# Patient Record
Sex: FEMALE | Race: WHITE | NOT HISPANIC OR LATINO | URBAN - METROPOLITAN AREA
[De-identification: names, ages, dates, MRNs, and addresses within clinical notes are randomized per-mention and may not be internally consistent; named-entity substitution may affect disease eponyms.]

---

## 2017-11-09 ENCOUNTER — CONVERSION ENCOUNTER (OUTPATIENT)
Dept: GENERAL RADIOLOGY | Facility: HOSPITAL | Age: 82
End: 2017-11-09

## 2018-03-22 ENCOUNTER — CONVERSION ENCOUNTER (OUTPATIENT)
Dept: CARDIOLOGY | Facility: CLINIC | Age: 83
End: 2018-03-22
Attending: INTERNAL MEDICINE

## 2018-03-29 ENCOUNTER — OFFICE VISIT CONVERTED (OUTPATIENT)
Dept: ORTHOPEDIC SURGERY | Facility: CLINIC | Age: 83
End: 2018-03-29
Attending: ORTHOPAEDIC SURGERY

## 2018-05-08 ENCOUNTER — OFFICE VISIT CONVERTED (OUTPATIENT)
Dept: ORTHOPEDIC SURGERY | Facility: CLINIC | Age: 83
End: 2018-05-08
Attending: PHYSICIAN ASSISTANT

## 2018-06-05 ENCOUNTER — OFFICE VISIT CONVERTED (OUTPATIENT)
Dept: ORTHOPEDIC SURGERY | Facility: CLINIC | Age: 83
End: 2018-06-05
Attending: PHYSICIAN ASSISTANT

## 2018-06-18 ENCOUNTER — CONVERSION ENCOUNTER (OUTPATIENT)
Dept: GENERAL RADIOLOGY | Facility: HOSPITAL | Age: 83
End: 2018-06-18

## 2018-06-26 ENCOUNTER — CONVERSION ENCOUNTER (OUTPATIENT)
Dept: MAMMOGRAPHY | Facility: HOSPITAL | Age: 83
End: 2018-06-26

## 2018-07-10 ENCOUNTER — CONVERSION ENCOUNTER (OUTPATIENT)
Dept: ORTHOPEDIC SURGERY | Facility: CLINIC | Age: 83
End: 2018-07-10

## 2018-07-10 ENCOUNTER — OFFICE VISIT CONVERTED (OUTPATIENT)
Dept: ORTHOPEDIC SURGERY | Facility: CLINIC | Age: 83
End: 2018-07-10
Attending: PHYSICIAN ASSISTANT

## 2018-07-17 ENCOUNTER — OFFICE VISIT CONVERTED (OUTPATIENT)
Dept: ORTHOPEDIC SURGERY | Facility: CLINIC | Age: 83
End: 2018-07-17
Attending: PHYSICIAN ASSISTANT

## 2018-09-27 ENCOUNTER — OFFICE VISIT CONVERTED (OUTPATIENT)
Dept: CARDIOLOGY | Facility: CLINIC | Age: 83
End: 2018-09-27
Attending: INTERNAL MEDICINE

## 2018-10-09 ENCOUNTER — OFFICE VISIT CONVERTED (OUTPATIENT)
Dept: ORTHOPEDIC SURGERY | Facility: CLINIC | Age: 83
End: 2018-10-09
Attending: PHYSICIAN ASSISTANT

## 2019-01-17 ENCOUNTER — OFFICE VISIT CONVERTED (OUTPATIENT)
Dept: ORTHOPEDIC SURGERY | Facility: CLINIC | Age: 84
End: 2019-01-17
Attending: PHYSICIAN ASSISTANT

## 2019-01-25 ENCOUNTER — OFFICE VISIT CONVERTED (OUTPATIENT)
Dept: CARDIOLOGY | Facility: CLINIC | Age: 84
End: 2019-01-25
Attending: INTERNAL MEDICINE

## 2019-01-25 ENCOUNTER — HOSPITAL ENCOUNTER (OUTPATIENT)
Dept: OTHER | Facility: HOSPITAL | Age: 84
Discharge: HOME | End: 2019-01-25
Attending: INTERNAL MEDICINE

## 2019-01-25 ENCOUNTER — CONVERSION ENCOUNTER (OUTPATIENT)
Dept: CARDIOLOGY | Facility: CLINIC | Age: 84
End: 2019-01-25

## 2019-01-25 LAB
ALBUMIN SERPL-MCNC: 4.4 G/DL (ref 3.5–5)
ALBUMIN/GLOB SERPL: 1.6 {RATIO} (ref 1.4–2.6)
ALP SERPL-CCNC: 67 U/L (ref 43–160)
ALT SERPL-CCNC: 12 U/L (ref 10–40)
ANION GAP SERPL CALC-SCNC: 20 MMOL/L (ref 8–19)
AST SERPL-CCNC: 16 U/L (ref 15–50)
BASOPHILS # BLD AUTO: 0.04 10*3/UL (ref 0–0.2)
BASOPHILS NFR BLD AUTO: 0.9 % (ref 0–3)
BILIRUB SERPL-MCNC: 0.55 MG/DL (ref 0.2–1.3)
BUN SERPL-MCNC: 20 MG/DL (ref 5–25)
BUN/CREAT SERPL: 23 {RATIO} (ref 6–20)
CALCIUM SERPL-MCNC: 9.6 MG/DL (ref 8.7–10.4)
CHLORIDE SERPL-SCNC: 100 MMOL/L (ref 99–111)
CONV CO2: 21 MMOL/L (ref 22–32)
CONV TOTAL PROTEIN: 7.2 G/DL (ref 6.3–8.2)
CREAT UR-MCNC: 0.87 MG/DL (ref 0.5–0.9)
EOSINOPHIL # BLD AUTO: 0.07 10*3/UL (ref 0–0.7)
EOSINOPHIL # BLD AUTO: 1.5 % (ref 0–7)
ERYTHROCYTE [DISTWIDTH] IN BLOOD BY AUTOMATED COUNT: 13.2 % (ref 11.5–14.5)
GFR SERPLBLD BASED ON 1.73 SQ M-ARVRAT: >60 ML/MIN/{1.73_M2}
GLOBULIN UR ELPH-MCNC: 2.8 G/DL (ref 2–3.5)
GLUCOSE SERPL-MCNC: 133 MG/DL (ref 65–99)
HBA1C MFR BLD: 11.4 G/DL (ref 12–16)
HCT VFR BLD AUTO: 34.3 % (ref 37–47)
LYMPHOCYTES # BLD AUTO: 1.06 10*3/UL (ref 1–5)
MCH RBC QN AUTO: 34.7 PG (ref 27–31)
MCHC RBC AUTO-ENTMCNC: 33.2 G/DL (ref 33–37)
MCV RBC AUTO: 104.3 FL (ref 81–99)
MONOCYTES # BLD AUTO: 0.42 10*3/UL (ref 0.2–1.2)
MONOCYTES NFR BLD AUTO: 8.9 % (ref 3–10)
NEUTROPHILS # BLD AUTO: 3.1 10*3/UL (ref 2–8)
NEUTROPHILS NFR BLD AUTO: 65.9 % (ref 30–85)
OSMOLALITY SERPL CALC.SUM OF ELEC: 289 MOSM/KG (ref 273–304)
PLATELET # BLD AUTO: 295 10*3/UL (ref 130–400)
PMV BLD AUTO: 9.5 FL (ref 7.4–10.4)
POTASSIUM SERPL-SCNC: 4.3 MMOL/L (ref 3.5–5.3)
RBC # BLD AUTO: 3.29 10*6/UL (ref 4.2–5.4)
SODIUM SERPL-SCNC: 137 MMOL/L (ref 135–147)
VARIANT LYMPHS NFR BLD MANUAL: 22.6 % (ref 20–45)
WBC # BLD AUTO: 4.7 10*3/UL (ref 4.8–10.8)

## 2019-02-01 ENCOUNTER — OFFICE VISIT CONVERTED (OUTPATIENT)
Dept: CARDIOLOGY | Facility: CLINIC | Age: 84
End: 2019-02-01
Attending: INTERNAL MEDICINE

## 2019-02-14 ENCOUNTER — OFFICE VISIT CONVERTED (OUTPATIENT)
Dept: ORTHOPEDIC SURGERY | Facility: CLINIC | Age: 84
End: 2019-02-14
Attending: PHYSICIAN ASSISTANT

## 2019-03-28 ENCOUNTER — OFFICE VISIT CONVERTED (OUTPATIENT)
Dept: ORTHOPEDIC SURGERY | Facility: CLINIC | Age: 84
End: 2019-03-28
Attending: PHYSICIAN ASSISTANT

## 2019-03-28 ENCOUNTER — CONVERSION ENCOUNTER (OUTPATIENT)
Dept: ORTHOPEDIC SURGERY | Facility: CLINIC | Age: 84
End: 2019-03-28

## 2019-05-02 ENCOUNTER — HOSPITAL ENCOUNTER (OUTPATIENT)
Dept: GENERAL RADIOLOGY | Facility: HOSPITAL | Age: 84
Discharge: HOME | End: 2019-05-02

## 2019-06-06 ENCOUNTER — OFFICE VISIT CONVERTED (OUTPATIENT)
Dept: ORTHOPEDIC SURGERY | Facility: CLINIC | Age: 84
End: 2019-06-06
Attending: PHYSICIAN ASSISTANT

## 2019-07-02 ENCOUNTER — CONVERSION ENCOUNTER (OUTPATIENT)
Dept: SURGERY | Facility: CLINIC | Age: 84
End: 2019-07-02

## 2019-07-02 ENCOUNTER — OFFICE VISIT CONVERTED (OUTPATIENT)
Dept: SURGERY | Facility: CLINIC | Age: 84
End: 2019-07-02
Attending: SURGERY

## 2019-08-14 ENCOUNTER — OFFICE VISIT CONVERTED (OUTPATIENT)
Dept: CARDIOLOGY | Facility: CLINIC | Age: 84
End: 2019-08-14
Attending: INTERNAL MEDICINE

## 2019-08-14 ENCOUNTER — CONVERSION ENCOUNTER (OUTPATIENT)
Dept: CARDIOLOGY | Facility: CLINIC | Age: 84
End: 2019-08-14

## 2019-08-30 ENCOUNTER — OFFICE VISIT CONVERTED (OUTPATIENT)
Dept: NEUROSURGERY | Facility: CLINIC | Age: 84
End: 2019-08-30
Attending: PHYSICIAN ASSISTANT

## 2019-08-30 ENCOUNTER — CONVERSION ENCOUNTER (OUTPATIENT)
Dept: NEUROLOGY | Facility: CLINIC | Age: 84
End: 2019-08-30

## 2019-10-08 ENCOUNTER — OFFICE VISIT CONVERTED (OUTPATIENT)
Dept: SURGERY | Facility: CLINIC | Age: 84
End: 2019-10-08
Attending: SURGERY

## 2019-10-29 ENCOUNTER — OFFICE VISIT CONVERTED (OUTPATIENT)
Dept: NEUROSURGERY | Facility: CLINIC | Age: 84
End: 2019-10-29
Attending: NEUROLOGICAL SURGERY

## 2019-10-29 ENCOUNTER — CONVERSION ENCOUNTER (OUTPATIENT)
Dept: NEUROLOGY | Facility: CLINIC | Age: 84
End: 2019-10-29

## 2019-12-06 ENCOUNTER — OFFICE VISIT CONVERTED (OUTPATIENT)
Dept: ORTHOPEDIC SURGERY | Facility: CLINIC | Age: 84
End: 2019-12-06
Attending: PHYSICIAN ASSISTANT

## 2020-01-17 ENCOUNTER — OFFICE VISIT CONVERTED (OUTPATIENT)
Dept: ORTHOPEDIC SURGERY | Facility: CLINIC | Age: 85
End: 2020-01-17
Attending: PHYSICIAN ASSISTANT

## 2020-03-10 ENCOUNTER — HOSPITAL ENCOUNTER (OUTPATIENT)
Dept: SURGERY | Facility: HOSPITAL | Age: 85
Setting detail: HOSPITAL OUTPATIENT SURGERY
Discharge: HOME | End: 2020-03-10
Attending: OPHTHALMOLOGY

## 2020-03-12 ENCOUNTER — OFFICE VISIT CONVERTED (OUTPATIENT)
Dept: CARDIOLOGY | Facility: CLINIC | Age: 85
End: 2020-03-12
Attending: INTERNAL MEDICINE

## 2020-04-21 ENCOUNTER — OFFICE VISIT CONVERTED (OUTPATIENT)
Dept: ORTHOPEDIC SURGERY | Facility: CLINIC | Age: 85
End: 2020-04-21
Attending: PHYSICIAN ASSISTANT

## 2020-05-31 LAB — SARS-COV-2 RNA SPEC QL NAA+PROBE: NOT DETECTED

## 2020-06-02 ENCOUNTER — HOSPITAL ENCOUNTER (OUTPATIENT)
Dept: PERIOP | Facility: HOSPITAL | Age: 85
Setting detail: HOSPITAL OUTPATIENT SURGERY
Discharge: HOME | End: 2020-06-02
Attending: OPHTHALMOLOGY

## 2020-07-14 ENCOUNTER — HOSPITAL ENCOUNTER (OUTPATIENT)
Dept: GENERAL RADIOLOGY | Facility: HOSPITAL | Age: 85
Discharge: HOME | End: 2020-07-14

## 2020-07-16 ENCOUNTER — OFFICE VISIT CONVERTED (OUTPATIENT)
Dept: SURGERY | Facility: CLINIC | Age: 85
End: 2020-07-16
Attending: SURGERY

## 2020-07-23 ENCOUNTER — HOSPITAL ENCOUNTER (OUTPATIENT)
Dept: ULTRASOUND IMAGING | Facility: HOSPITAL | Age: 85
Discharge: HOME | End: 2020-07-23

## 2020-07-27 ENCOUNTER — HOSPITAL ENCOUNTER (OUTPATIENT)
Dept: MRI IMAGING | Facility: HOSPITAL | Age: 85
Discharge: HOME | End: 2020-07-27
Attending: SURGERY

## 2020-07-27 LAB
CREAT BLD-MCNC: 0.9 MG/DL (ref 0.6–1.4)
GFR SERPLBLD BASED ON 1.73 SQ M-ARVRAT: 58 ML/MIN/{1.73_M2}

## 2020-07-28 ENCOUNTER — HOSPITAL ENCOUNTER (OUTPATIENT)
Dept: PREADMISSION TESTING | Facility: HOSPITAL | Age: 85
Discharge: HOME | End: 2020-07-28
Attending: SURGERY

## 2020-07-30 ENCOUNTER — HOSPITAL ENCOUNTER (OUTPATIENT)
Dept: ULTRASOUND IMAGING | Facility: HOSPITAL | Age: 85
Discharge: HOME | End: 2020-07-30
Attending: SURGERY

## 2020-07-30 LAB — SARS-COV-2 RNA SPEC QL NAA+PROBE: NOT DETECTED

## 2020-08-06 ENCOUNTER — OFFICE VISIT CONVERTED (OUTPATIENT)
Dept: OTHER | Facility: HOSPITAL | Age: 85
End: 2020-08-06
Attending: SURGERY

## 2020-08-06 ENCOUNTER — HOSPITAL ENCOUNTER (OUTPATIENT)
Dept: OTHER | Facility: HOSPITAL | Age: 85
Discharge: HOME | End: 2020-08-06
Attending: INTERNAL MEDICINE

## 2020-08-06 ENCOUNTER — OFFICE VISIT CONVERTED (OUTPATIENT)
Dept: ONCOLOGY | Facility: HOSPITAL | Age: 85
End: 2020-08-06
Attending: INTERNAL MEDICINE

## 2020-08-13 ENCOUNTER — HOSPITAL ENCOUNTER (OUTPATIENT)
Dept: PREADMISSION TESTING | Facility: HOSPITAL | Age: 85
Discharge: HOME | End: 2020-08-13
Attending: SURGERY

## 2020-08-15 LAB — SARS-COV-2 RNA SPEC QL NAA+PROBE: NOT DETECTED

## 2021-05-10 NOTE — H&P
History and Physical      Patient Name: Mckayla Elias   Patient ID: [id number]   Sex: Female   YOB: 1934    Primary Care Provider: Jerri ROWLEY   Referring Provider: Jerri ROWLEY    Visit Date: August 6, 2020    Provider: Katherine Curry MD   Location: [facility]   Location Address: [facility]  [place]  [id number]   Location Phone: [phone]          Chief Complaint  · Breast Cancer  · Pre-Surgical History and Physical Examination for [facility]  · Consents for Surgery      History Of Present Illness  Mckayla Elias is a 86 year old /White female who is referred from Jerri ROWLEY ; very pleasant lady with 2 sites on left breast ( one at 3 oclock and one at 9 oclock) and one biopsied on right ( that one returned as ADH.)   Palpation-guided or Image Guided needle biopsy:    * Image guided needle biospy performed. Results from the needle guided biopsy are both sites + for IDC grade 1 ER/VT + her 2 negative and ki 67 of 5% so multicentric with surrounding LCIS.         Past Medical History  Acquired spondylolisthesis , lumbar; Arthritis; Bilateral hip pain; Bilateral knee pain; Degeneration of lumbar intervertebral disc; Diverticulitis; Gastroesophageal reflux; Hypertension; Hypothyroidism; Limb Swelling; Lumbago; Lumbago/low back pain; Osteoarthritis; Primary osteoarthritis of knees, bilateral; Reflux; Thyroid disorder         Past Surgical History  Artificial Joints/Limbs; Breast biopsy; Cardiac Catherization; D&C; Excision of bone spur of left calcaneus; Spinal Surgery         Medication List  acetaminophen 500 mg oral tablet; albuterol sulfate 90 mcg/actuation inhalation HFA aerosol inhaler; hydrochlorothiazide 12.5 mg oral capsule; iron 325 mg (65 mg iron) oral tablet; levothyroxine 25 mcg oral tablet; losartan 25 mg oral tablet; metoprolol succinate 25 mg oral tablet extended release 24 hr; Probiotic 15 billion cell oral capsule;  "ranitidine HCl 150 mg oral tablet; sucralfate 1 gram oral tablet; tramadol 50 mg oral tablet; Voltaren 1 % topical gel         Allergy List  Demerol; Lamisil; PENICILLINS; TETRACYCLINES       Allergies Reconciled  Family Medical History  Heart Disease; Cancer, Unspecified; Spine Problems; Family history of certain chronic disabling diseases; arthritis; Osteoporosis         Social History  Alcohol Use (Never); Claustophobic (Unknown); lives with children; lives with spouse; .; Recreational Drug Use (Never); Retired.; Tobacco (Never)         Review of Systems  · Constitutional  o Denies  o : fever, chills  · Breasts  o * See HPI  · Cardiovascular  o Denies  o : chest pain, cardiac murmurs, irregular heart beats, dyspnea on exertion  · Integument  o Denies  o : rash, itching, new skin lesions  · Heme-Lymph  o Denies  o : easy bleeding, easy bruising, lymph node enlargement or tenderness      Vitals  Date Time BP Position Site L\R Cuff Size HR RR TEMP (F) WT  HT  BMI kg/m2 BSA m2 O2 Sat        08/06/2020 01:22 PM       16  177lbs 2oz 5'  3.5\" 30.88 1.9           Physical Examination  · Constitutional  o Appearance  o : A well-nourished, well-developed patient who ambulates without difficulty, Alert and Oriented X3.  · Respiratory  o Respiratory Effort  o : breathing unlabored  o Inspection of Chest  o : breaths equal bilaterally  · Breasts  o Inspection of Breasts  o : developmental state normal for age  o Palpation of Breasts, Axillae  o : no changes          Assessment  · Malignant neoplasm of overlapping sites of left breast in female, estrogen receptor positive       Malignant neoplasm of overlapping sites of left female breast     174.8/C50.812  Estrogen receptor positive status [ER+]     174.8/Z17.0  · Preoperative Examination     V72.83      Plan  · Orders  o General Surgery Order (GENOR) - - 08/06/2020  o Lymphedema Clinic Consult (Order_PT/OT Consult for Bioimpedence Lymph fluid analysis,Pre-op and " retest Post-op, every 3 mo. Yr. 1-3, 6 mo. Yr. 4-5, annually year 6+) (LYMCL) - 174.8/C50.812, 174.8/Z17.0 - 08/06/2020  o [place] Node(Left) Identification (Detection) [[id number]] (29057) - 174.8/C50.812, 174.8/Z17.0 - 08/18/2020  o General Surgery Order (GENOR) - 174.8/C50.812, 174.8/Z17.0 - 08/18/2020  o [facility] Pre-Op Covid-19 Screening (04378) - 174.8/C50.812, 174.8/Z17.0 - 08/13/2020  · Medications  o Medications have been Reconciled  o Transition of Care or Provider Policy  · Instructions  o DISCUSSION:  o The patient has a primary breast cancer confirmed by biopsy. I have reviewed the radiographic studies and pathology report. I have recommended a surgical procedure to the patient as a option in treatment. Other options were discussed in detail with ample time to answer questions.  o PLAN:  o ****Surgical Treatment Addendum****  o Breast Conserving Therapy: Offered and patient declined. REASON: multicentric disease and pt does not want radiation  o [place] node dissection offfered and will perform as a part of surgery.  o Reconstructive/Plastice Surgery referral offered prior to surgery and patient declined. Reason: does not want  o Handouts Provided-Pre-Procedure Instructions including date and time and location of procedure.  o ****Surgical Orders****  o ****Patient Status****  o RISK AND BENEFITS:  o Consent for surgery: Given these options, the patient has verbally expressed an understanding of the risks of surgery and finds these risks acceptable. We will proceed with surgery as soon as possible.  o Possible risks, complications, benefits, and alternatives to surgical or invasive procedure have been explained to patient and/or legal guardian.  o O.R. PREP: Per protocol  o IV: Per Anesthesia  o Please sign permit for: Bilateral mastectomy with left sentinel node identification and biopsy, possible axillary dissection  o Kefzol 2 grams IV on call to OR.  o The above History and Physical Examination has been  completed within 30 days of admission.            Electronically Signed by: Katherine Curry MD -Author on August 6, 2020 02:11:49 PM

## 2021-05-10 NOTE — H&P
History and Physical      Patient Name: Mckayla Elias   Patient ID: [id number]   Sex: Female   YOB: 1934    Primary Care Provider: Jerri ROWLEY   Referring Provider: Jerri ROWLEY    Visit Date: July 16, 2020    Provider: Katherine Curry MD   Location: [facility]   Location Address: [address] [place] [address]  [id number]   Location Phone: [phone]          Chief Complaint  · Right breast mass  · Pre-Surgical History and Physical Examination for [facility]  · Consents for Surgery      History Of Present Illness  Mckayla Elias is a 86 year old /White female who is referred from Jerri ROWLEY ; very pleasant lady here with a palpable right breast mass that was not seen on imaging and 2 sub cm masses on left breast that are scheduled to be biopsied next Thursday. She has an extensive breast cancer history and her daughter passed away from breast cancer and all of her nieces have had it and all but one passed from breast cancer.             Past Medical History  Acquired spondylolisthesis , lumbar; Arthritis; Bilateral hip pain; Bilateral knee pain; Degeneration of lumbar intervertebral disc; Diverticulitis; Gastroesophageal reflux; Hypertension; Hypothyroidism; Limb Swelling; Lumbago; Lumbago/low back pain; Osteoarthritis; Primary osteoarthritis of knees, bilateral; Reflux; Thyroid disorder         Past Surgical History  Artificial Joints/Limbs; Breast biopsy; Cardiac Catherization; D&C; Excision of bone spur of left calcaneus; Spinal Surgery         Medication List  acetaminophen 500 mg oral tablet; albuterol sulfate 90 mcg/actuation inhalation HFA aerosol inhaler; hydrochlorothiazide 12.5 mg oral capsule; iron 325 mg (65 mg iron) oral tablet; levothyroxine 25 mcg oral tablet; losartan 25 mg oral tablet; metoprolol succinate 25 mg oral tablet extended release 24 hr; Probiotic 15 billion cell oral capsule; ranitidine HCl 150 mg oral tablet; sucralfate 1  "gram oral tablet; tramadol 50 mg oral tablet; Voltaren 1 % topical gel         Allergy List  Demerol; Lamisil; PENICILLINS; TETRACYCLINES       Allergies Reconciled  Family Medical History  Heart Disease; Cancer, Unspecified; Spine Problems; Family history of certain chronic disabling diseases; arthritis; Osteoporosis         Social History  Alcohol Use (Never); Claustophobic (Unknown); lives with children; lives with spouse; .; Recreational Drug Use (Never); Retired.; Tobacco (Never)         Review of Systems  · Constitutional  o Denies  o : fever, chills  · Breasts  o * See HPI  · Cardiovascular  o Denies  o : chest pain, cardiac murmurs, irregular heart beats, dyspnea on exertion  · Integument  o Denies  o : rash, itching, new skin lesions  · Heme-Lymph  o Denies  o : easy bleeding, easy bruising, lymph node enlargement or tenderness      Vitals  Date Time BP Position Site L\R Cuff Size HR RR TEMP (F) WT  HT  BMI kg/m2 BSA m2 O2 Sat HC       07/16/2020 09:18 AM       16  184lbs 0oz 5'  4\" 31.58 1.94           Physical Examination  · Constitutional  o Appearance  o : A well-nourished, well-developed patient who ambulates without difficulty, Alert and Oriented X3.  · Respiratory  o Respiratory Effort  o : breathing unlabored  o Inspection of Chest  o : breaths equal bilaterally  · Breasts  o Inspection of Breasts  o : developmental state normal for age  o Palpation of Breasts, Axillae  o : Palpable 2 cm mass just under right areola at 12 oclock, no palpable masses on left, no LAD          Assessment  · Breast Mass     611.72/N63  · Preoperative Examination     V72.83      Plan  · Orders  o General Surgery Order (GENOR) - - 07/16/2020  o Lymphedema Clinic Consult (Order_PT/OT Consult for Bioimpedence Lymph fluid analysis,Pre-op and retest Post-op, every 3 mo. Yr. 1-3, 6 mo. Yr. 4-5, annually year 6+) (LYMCL) - - 07/16/2020  o [facility] Pre-Op Covid-19 Screening (03962) - 611.72/N63 - 07/28/2020  o General Surgery " Order (MARYBEL) - 611.72/N63 - 07/31/2020  · Medications  o Medications have been Reconciled  o Transition of Care or Provider Policy  · Instructions  o PLAN:  o Handouts Provided-Pre-Procedure Instructions including date and time and location of procedure.  o ****Surgical Orders****  o ****Patient Status****  o Followup on biopsies to ensure we do not need to revise surgical plan to include left breast   o RISK AND BENEFITS:  o Consent for surgery: Given these options, the patient has verbally expressed an understanding of the risks of surgery and finds these risks acceptable. We will proceed with surgery as soon as possible.  o Possible risks, complications, benefits, and alternatives to surgical or invasive procedure have been explained to patient and/or legal guardian.  o O.R. PREP: Per protocol  o IV: Per Anesthesia  o Please sign permit for: Excision of right breast mass  o Kefzol 2 grams IV on call to OR.  o The above History and Physical Examination has been completed within 30 days of admission.            Electronically Signed by: Katherine Curry MD -Author on July 16, 2020 09:35:18 AM

## 2021-05-12 NOTE — PROGRESS NOTES
Progress Note      Patient Name: Mckayla Elias   Patient ID: [id number]   Sex: Female   YOB: 1934    Primary Care Provider: Jerri ROWLEY   Referring Provider: Jerri ROWLEY    Visit Date: April 21, 2020    Provider: Flori Baldwin PA-C   Location: [facility]   Location Address: [address] [place] [address]  [id number]   Location Phone: [phone]          Chief Complaint  · Bilateral Knee pain      History Of Present Illness  Mckayla Elias is a 86 year old /White female who presents today to [facility].      Patient is status post right TKA 4/23/18 and left TKA 12/31/18. She had recent fall in August resulting in left leg pain. Patient states pain at the low back radiating to left knee. Patient states IM steroid injection provided relief of pain.          Past Medical History  Acquired spondylolisthesis , lumbar; Arthritis; Bilateral hip pain; Bilateral knee pain; Degeneration of lumbar intervertebral disc; Diverticulitis; Gastroesophageal reflux; Hypertension; Hypothyroidism; Limb Swelling; Lumbago; Lumbago/low back pain; Osteoarthritis; Primary osteoarthritis of knees, bilateral; Reflux; Thyroid disorder         Past Surgical History  Artificial Joints/Limbs; Breast biopsy; Cardiac Catherization; D&C; Excision of bone spur of left calcaneus; Spinal Surgery         Medication List  acetaminophen 500 mg oral tablet; albuterol sulfate 90 mcg/actuation inhalation HFA aerosol inhaler; hydrochlorothiazide 12.5 mg oral capsule; iron 325 mg (65 mg iron) oral tablet; levothyroxine 25 mcg oral tablet; losartan 25 mg oral tablet; metoprolol succinate 25 mg oral tablet extended release 24 hr; Probiotic 15 billion cell oral capsule; ranitidine HCl 150 mg oral tablet; sucralfate 1 gram oral tablet; tramadol 50 mg oral tablet; Voltaren 1 % topical gel         Allergy List  Demerol; Lamisil; PENICILLINS; TETRACYCLINES         Family Medical History  Heart Disease; Cancer,  "Unspecified; Spine Problems; Family history of certain chronic disabling diseases; arthritis; Osteoporosis         Social History  Alcohol Use (Never); Claustophobic (Unknown); lives with children; lives with spouse; .; Recreational Drug Use (Never); Retired.; Tobacco (Never)         Review of Systems  · Constitutional  o Denies  o : fever, chills, weight loss  · Cardiovascular  o Denies  o : chest pain, shortness of breath  · Gastrointestinal  o Denies  o : liver disease, heartburn, nausea, blood in stools  · Genitourinary  o Denies  o : painful urination, blood in urine  · Integument  o Denies  o : rash, itching  · Neurologic  o Denies  o : headache, weakness, loss of consciousness  · Musculoskeletal  o Denies  o : painful, swollen joints  · Psychiatric  o Denies  o : drug/alcohol addiction, anxiety, depression      Vitals  Date Time BP Position Site L\R Cuff Size HR RR TEMP (F) WT  HT  BMI kg/m2 BSA m2 O2 Sat        04/21/2020 01:59 PM      91 - R   184lbs 6oz 5'  4\" 31.65 1.94 92 %          Physical Examination  · Constitutional  o Appearance  o : well developed, well-nourished, no obvious deformities present  · Head and Face  o Head  o :   § Inspection  § : normocephalic  o Face  o :   § Inspection  § : no facial lesions  · Eyes  o Conjunctivae  o : conjunctivae normal  o Sclerae  o : sclerae white  · Ears, Nose, Mouth and Throat  o Ears  o :   § External Ears  § : appearance within normal limits  § Hearing  § : intact  o Nose  o :   § External Nose  § : appearance normal  · Neck  o Inspection/Palpation  o : normal appearance  o Range of Motion  o : full range of motion  · Respiratory  o Respiratory Effort  o : breathing unlabored  o Inspection of Chest  o : normal appearance  o Auscultation of Lungs  o : no audible wheezing or rales  · Cardiovascular  o Heart  o : regular rate  · Gastrointestinal  o Abdominal Examination  o : soft and non-tender  · Skin and Subcutaneous Tissue  o General " Inspection  o : intact, no rashes  · Psychiatric  o General  o : Alert and oriented x3  o Judgement and Insight  o : judgment and insight intact  o Mood and Affect  o : mood normal, affect appropriate  · Back  o Inspection  o : no skin discoloration, no swelling. Mild tenderness lumbar spine. Neurovascular and sensation intact.  · Injection Note/Aspiration Note  o Site  o : right hip  o Procedure  o : Procedure: After educating the patient, patient gave consent for the procedure. After using alcohol prep, injection was given. The patient tolerated the procedure well.   o Medication  o : 2ml's of 4 mg Dexamethasone  · Right Knee-Street  o Inspection  o : limping gait, weight bearing, no swelling, no ecchymosis, no atrophy, neutral alignment  o Palpation  o : no medial joint line tenderness, no lateral joint line tenderness, no patellar tendon tenderness, no pain of MCL, no pain at LCL  o ROM  o : full extension, full flexion  o Strength  o : full extension, full flexion  o Special Tests  o : negative varus stress, negaitve valgus stress  o Neurovascular  o : Full sensation, Dorsal Pedal Pulse 2+, posteriror tibialis pulse 2+  · Left Knee-Street  o Inspection  o : limping gait, weight bearing, no swelling, no ecchymosis, no atrophy, neutral alignment  o Palpation  o : no medial joint line tenderness, no lateral joint line tenderness, no patellar tendon tenderness, no pain of MCL, no pain at LCL  o ROM  o : full extension, full flexion  o Strength  o : full extension, full flexion  o Special Tests  o : negative varus stress, negaitve valgus stress  o Neurovascular  o : Full sensation, Dorsal Pedal Pulse 2+, posteriror tibialis pulse 2+          Assessment  · Low Back Pain     724.2/M54.5  · Pain in both knees, unspecified chronicity       Pain in right knee     719.46/M25.561  Pain in left knee     719.46/M25.562      Plan  · Orders  o 2.00 - Dexamethasone Injection 8mg (-9) - 719.46/M25.562 - 04/21/2020   Lot  [id number] Exp 01 2021 [facility] Administered by MARVA PORTER  o IM/SQ - Injection Fee [facility] 56508 - 719.46/M25.562 - 04/21/2020   Administered by MARVA PORTER  · Medications  o Medications have been Reconciled  o Transition of Care or Provider Policy  · Instructions  o Reviewed the patient's Past Medical, Social, and Family history as well as the ROS at today's visit, no changes.  o Call or return if worsening symptoms.  o Follow Up PRN.  o Electronically Identified Patient Education Materials Provided Electronically            Electronically Signed by: Flori Baldwin PA-C -Author on April 21, 2020 02:13:59 PM

## 2021-05-15 VITALS — BODY MASS INDEX: 32.1 KG/M2 | HEIGHT: 64 IN | OXYGEN SATURATION: 98 % | HEART RATE: 62 BPM | WEIGHT: 188 LBS

## 2021-05-15 VITALS — BODY MASS INDEX: 30.91 KG/M2 | RESPIRATION RATE: 14 BRPM | WEIGHT: 185.5 LBS | HEIGHT: 65 IN

## 2021-05-15 VITALS
DIASTOLIC BLOOD PRESSURE: 66 MMHG | HEIGHT: 65 IN | SYSTOLIC BLOOD PRESSURE: 146 MMHG | HEART RATE: 64 BPM | WEIGHT: 183 LBS | BODY MASS INDEX: 30.49 KG/M2

## 2021-05-15 VITALS
SYSTOLIC BLOOD PRESSURE: 133 MMHG | WEIGHT: 188.31 LBS | DIASTOLIC BLOOD PRESSURE: 60 MMHG | HEIGHT: 65 IN | BODY MASS INDEX: 31.38 KG/M2

## 2021-05-15 VITALS — BODY MASS INDEX: 31.32 KG/M2 | HEIGHT: 65 IN | HEART RATE: 65 BPM | WEIGHT: 188 LBS | OXYGEN SATURATION: 98 %

## 2021-05-15 VITALS — HEART RATE: 66 BPM | WEIGHT: 188 LBS | OXYGEN SATURATION: 97 % | HEIGHT: 64 IN | BODY MASS INDEX: 32.1 KG/M2

## 2021-05-15 VITALS — BODY MASS INDEX: 34.04 KG/M2 | HEIGHT: 63 IN | WEIGHT: 192.12 LBS | RESPIRATION RATE: 16 BRPM

## 2021-05-15 VITALS — BODY MASS INDEX: 33.84 KG/M2 | OXYGEN SATURATION: 99 % | HEART RATE: 72 BPM | HEIGHT: 64 IN | WEIGHT: 198.25 LBS

## 2021-05-15 VITALS — HEIGHT: 64 IN | BODY MASS INDEX: 31.41 KG/M2 | RESPIRATION RATE: 16 BRPM | WEIGHT: 184 LBS

## 2021-05-15 VITALS
SYSTOLIC BLOOD PRESSURE: 158 MMHG | DIASTOLIC BLOOD PRESSURE: 78 MMHG | BODY MASS INDEX: 31.99 KG/M2 | WEIGHT: 192 LBS | HEIGHT: 65 IN | HEART RATE: 66 BPM

## 2021-05-15 VITALS — OXYGEN SATURATION: 92 % | WEIGHT: 184.37 LBS | BODY MASS INDEX: 31.48 KG/M2 | HEIGHT: 64 IN | HEART RATE: 91 BPM

## 2021-05-15 VITALS — RESPIRATION RATE: 16 BRPM | BODY MASS INDEX: 31.38 KG/M2 | WEIGHT: 177.12 LBS | HEIGHT: 63 IN

## 2021-05-15 VITALS
DIASTOLIC BLOOD PRESSURE: 69 MMHG | BODY MASS INDEX: 31.86 KG/M2 | WEIGHT: 191.25 LBS | HEIGHT: 65 IN | SYSTOLIC BLOOD PRESSURE: 147 MMHG

## 2021-05-16 VITALS
BODY MASS INDEX: 30.66 KG/M2 | WEIGHT: 184 LBS | SYSTOLIC BLOOD PRESSURE: 146 MMHG | HEIGHT: 65 IN | DIASTOLIC BLOOD PRESSURE: 70 MMHG | HEART RATE: 73 BPM

## 2021-05-16 VITALS — OXYGEN SATURATION: 99 % | HEIGHT: 64 IN | BODY MASS INDEX: 30.73 KG/M2 | HEART RATE: 84 BPM | WEIGHT: 180 LBS

## 2021-05-16 VITALS
HEART RATE: 60 BPM | BODY MASS INDEX: 30.32 KG/M2 | HEIGHT: 65 IN | SYSTOLIC BLOOD PRESSURE: 130 MMHG | WEIGHT: 182 LBS | DIASTOLIC BLOOD PRESSURE: 56 MMHG

## 2021-05-16 VITALS — WEIGHT: 192 LBS | HEIGHT: 64 IN | BODY MASS INDEX: 32.78 KG/M2 | OXYGEN SATURATION: 98 % | HEART RATE: 68 BPM

## 2021-05-16 VITALS
BODY MASS INDEX: 31.16 KG/M2 | SYSTOLIC BLOOD PRESSURE: 140 MMHG | WEIGHT: 187 LBS | DIASTOLIC BLOOD PRESSURE: 58 MMHG | HEART RATE: 64 BPM | HEIGHT: 65 IN

## 2021-05-16 VITALS — BODY MASS INDEX: 30.57 KG/M2 | OXYGEN SATURATION: 98 % | HEIGHT: 65 IN | HEART RATE: 64 BPM | WEIGHT: 183.5 LBS

## 2021-05-16 VITALS — HEIGHT: 64 IN | WEIGHT: 184 LBS | HEART RATE: 70 BPM | OXYGEN SATURATION: 97 % | BODY MASS INDEX: 31.41 KG/M2

## 2021-05-16 VITALS — HEART RATE: 68 BPM | HEIGHT: 65 IN | OXYGEN SATURATION: 97 % | BODY MASS INDEX: 31.16 KG/M2 | WEIGHT: 187 LBS

## 2021-05-16 VITALS — WEIGHT: 182 LBS | HEART RATE: 82 BPM | BODY MASS INDEX: 31.07 KG/M2 | HEIGHT: 64 IN | OXYGEN SATURATION: 97 %

## 2021-05-16 VITALS — OXYGEN SATURATION: 98 % | HEART RATE: 70 BPM | WEIGHT: 192.06 LBS | BODY MASS INDEX: 32.79 KG/M2 | HEIGHT: 64 IN

## 2021-05-16 VITALS — WEIGHT: 183 LBS | OXYGEN SATURATION: 97 % | HEART RATE: 76 BPM | HEIGHT: 64 IN | BODY MASS INDEX: 31.24 KG/M2

## 2021-05-16 VITALS
HEART RATE: 136 BPM | HEIGHT: 65 IN | SYSTOLIC BLOOD PRESSURE: 160 MMHG | DIASTOLIC BLOOD PRESSURE: 94 MMHG | BODY MASS INDEX: 29.49 KG/M2 | WEIGHT: 177 LBS

## 2021-05-28 VITALS
TEMPERATURE: 98 F | WEIGHT: 177 LBS | OXYGEN SATURATION: 99 % | HEART RATE: 70 BPM | RESPIRATION RATE: 18 BRPM | SYSTOLIC BLOOD PRESSURE: 153 MMHG | DIASTOLIC BLOOD PRESSURE: 77 MMHG | HEIGHT: 63 IN | BODY MASS INDEX: 31.36 KG/M2

## 2021-05-28 NOTE — PROGRESS NOTES
Patient: ZO CARLSON     Acct: [id number]     Report: #[id number]  UNIT #: [id number]     : 1934    Encounter Date:2020  PRIMARY CARE: ADRIEN DILLON  ***Signed***  --------------------------------------------------------------------------------------------------------------------  NURSE INTAKE      Visit Type      New Patient Visit            Chief Complaint      BREAST CA      Intent of Therapy:  Curative            Referring Provider/Copies To      Referring Provider:  MAYNOR CURRY MD      Primary Care Provider:  ADRIEN DILLON      Copies To:   ADRIEN DLILON            History and Present Illness      Past Oncology Illness History      Patient presents today with biopsy-proven invasive ductal carcinoma of the Left     breast 2020 ER+ MT+ Her2-. Surgery scheduled for 2020 by Dr. Curry.            HPI - Oncology Interim      Patient presents today for evaluation of newly diagnosed left breast cancer.      Patient states that she has felt a slight lump in the breast for more than a     year.  This was initially evaluated on mammogram which was reportedly     unremarkable.  She notes that she saw surgeon who evaluated her and suggested     surveillance.  She notes that the lump has been unchanged over that timeframe.      Slightly tender to palpation.  She denies discharge or bleeding from either     nipple.  She denies any other masses or lymphadenopathy.  She underwent     mammogram 2020 which demonstrated concerning findings in the left breast as    well as a abnormality on the right.  Subsequent diagnostic mammogram and     ultrasound confirmed the findings.  Biopsy was recommended.  On 2020 she     underwent biopsy of 2 separate locations at the 3 and 9:00 positions of the left    breast.  These both demonstrated invasive ductal carcinoma with associated LCIS.     ER positive, MT positive, HER-2 negative.  Subsequent biopsy on the right     breast 2020 showed atypical  ductal hyperplasia.      She states she is in her usual health.  She denies other masses or     lymphadenopathy.  No unusual aches or pains.  She is able to perform all of her     ADLs.            Cancer Details            2020 Left invasive ductal carcinoma, grade 1. ER+, NM+, HER2-.  Right      breast biopsy demonstrates atypical ductal hyperplasia            Clinical Trial Participant      No            ECOG Performance Status      0            Most Recent Imaging Findings      Patient: ZO CARLSON   Acct: #[id number]   Report: #[id number]            UNIT #: [id number]    DOS: 20 1237      INSURANCE:MEDICARE PART A   LOCATION:     : 1934            PROVIDERS      ADMITTING:     ATTENDING: MAYNOR WHITLOCK MD      FAMILY:  ADRIEN DILLON   ORDERING:  MAYNOR WHITLOCK MD         OTHER:    DICTATING:  Fatemeh Bazzi MD            REQ #:[id number]   EXAM:BRELTDL - US BREAST LIMITED LEFT      REASON FOR EXAM:  LT BREAST ENHANCEMENT      REASON FOR VISIT:  LT BREAST ENHANCEMENT            *******Signed******         PROCEDURE:   LEFT BREAST ULTRASOUND, FOCUSED             COMPARISON:   [facility], MR, BREAST BILATERAL W/WO CONTRAST,     2020, 9:01.        [facility], US, US BREAST BILATERAL LIMITED, 2020,     14:48.  [facility], MG, DIG DIAG BILAT TORRES W 3D CAMRYN, 2020, 13:30.  [facility],       US, ULTRASOUND BREAST BX 1ST LESION, 2020, 8:48.  [facility],    US, US BREAST BX       EA ADDL, 2020, 8:31.             INDICATIONS:   86-year-old woman recommended for second-look ultrasound based on    bilateral abnormal       MRI findings.  She is considering breast conservation.             TECHNIQUE:   A targeted breast ultrasound was performed, with evaluation     focusing only on specific       areas of concern.             FINDINGS:         Right:      Sonographically  benign appearing right axillary lymph nodes are noted.             Corresponding to non mass enhancement in the subareolar slightly upper right     breast, there are       mildly dilated ducts containing debris or small masses at 12:00 o'clock, 1 cm     from the nipple       (included on the second series of the ultrasound exam).  This is amenable to     ultrasound-guided       biopsy.             Incidental note is made of a 7 x 4 x 6 mm irregular hypoechoic mass at 12:00, 4     cm from the nipple       in the right breast, for which ultrasound-guided biopsy is recommended.             Left:      Corresponding to non mass enhancement in the upper central left breast on MRI,     there is 7 x 4 x 6       mm heterogeneous mass with internal septations at 12:00, 3 cm from nipple, for     which       ultrasound-guided biopsy is recommended.             Probably corresponding to the focal non mass enhancement in the subareolar     slightly inner, slightly       lower left breast, there is a 4 x 4 x 4 mm hypoechoic mass in the retroareolar     left breast, which       is amenable to ultrasound-guided biopsy.             CONCLUSION:    Recommend ultrasound-guided biopsy of 2 right breast masses and 2    left breast       masses.  These account for MRI findings, and therefore MRI guided biopsy is not     needed.  I       discussed the result with the patient following the exam, and biopsy will be     performed today.             RECOMMENDATION(S):          ULTRASOUND-GUIDED CORE BIOPSY: BILATERAL BREASTS               BIRADS:          DIAGNOSTIC CATEGORY 4--SUSPICIOUS               PLEASE NOTE:  THE [employer] NOW RECOMMENDS THAT ALL WOMEN WITH     >20% LIFETIME RISK OF       BREAST CANCER UNDERGO ANNUAL SCREENING BREAST MRI AND WOMEN WITH 15-20% SPEAK     WITH THEIR DOCTORS       ABOUT ANNUAL SCREENING BREAST MRI.  A NORMAL ULTRASOUND EXAMINATION DOES NOT     EXCLUDE THE       POSSIBILITY OF BREAST CANCER.  A  CLINICALLY SUSPICIOUS PALPABLE LUMP SHOULD BE     BIOPSIED.              BONNIE HOLDEN MD             Electronically Signed and Approved By: BONNIE HOLDEN MD on 2020 at 16:34                                  Until signed, this is an unconfirmed preliminary report that may contain      errors and is subject to change.                                              BARLA:      D:20 1540      Patient: ZO CARLSON   Acct: #[id number]   Report: #[id number]            UNIT #: [id number]    DOS: 20 0831      INSURANCE:MEDICARE PART A   LOCATION:     : 1934            PROVIDERS      ADMITTING:     ATTENDING: ADRIEN DILLON      FAMILY:  ADRIEN DILLON   ORDERING:  ADRIEN DILLON         OTHER:    DICTATING:  Luis Harris DO            REQ #:[id number]   EXAM:BXDXNOCADL - DIG DX UNI POST BX NO CAD LEFT      REASON FOR EXAM:  LT BREAST MASSES      REASON FOR VISIT:  LT BREAST MASSES            *******Signed with Addenda******                  ADDENDUM            This report includes an Addendum and supersedes previous reports for this exam.             PROCEDURE:   ULTRASOUND BREAST BX 1ST LESION, 2020, 8:48      US BREAST BX EA ADDL, 2020, 8:31      DIG DX UNI POST BX NO CAD LT, 2020, 10:06             COMPARISON:   [facility], US, US BREAST BILATERAL LIMITED,    2020, 14:48.        [facility], MG, DIG DIAG BILAT TORRES W 3D CAMRYN, 2020,     13:30.        [facility], MG, DIG SCREENING BILAT TORRES W 3D CAMRYN,     2019, 12:47.        [facility], MG, DIGITAL SCREENING NO CAD, 2017,     12:41.  [facility], MG, DIGITAL SCREENING W/CAD, 2016, 11:40.  [facility], US,       ULTRASOUND BREAST BX 1ST LESION, 2018, 10:58.             INDICATIONS:   86-year-old female presents today for ultrasound-guided core     biopsies of an  "irregular       hypoechoic 17 mm mass in the medial left breast 9 o'clock position and a 7 mm     heterogeneous solid       mass in the lateral left breast at the 3 o'clock position.        DESCRIPTION:         Following a discussion of risks, benefits, and alternatives to the procedure     informed consent was       obtained. The patient's medication list was reviewed and documented in the     medical record. The       patient was placed in the supine position on the ultrasound procedure table.     Initial screening       ultrasound images of the left breast were obtained. There was re-identification     of the irregular       shadowing 17 mm mass in the medial left breast at the 9 o'clock position 9 cm     from the nipple as       well as the 7 mm heterogeneous mass in the lateral left breast at the 3 o'clock     position 10 cm from       the nipple seen on previous diagnostic imaging.  Both sites overlying the     finding was then marked       under ultrasound guidance.              Time out was performed. The site was then prepped and draped in the usual     sterile fashion.              First, the 7 mm mass in the lateral left breast was targeted using ultrasound     guidance.  2%       lidocaine was used for cutaneous local anesthesia. Lidocaine mixed with     epinephrine was used for       deep soft tissue local anesthesia and to assist with hemostasis. The 12 gauge     [employer] automated core       biopsy needle was advanced under ultrasound guidance to the lesion. A total of 3    core biopsy       specimens were obtained and placed in formalin, labeled \"Site A\" to be sent to     pathology for       further analysis. A coil shaped clip was then placed at the of biopsy under     ultrasound guidance.       Digital pressure was then held at the site of biopsy to assist with hemostasis.              Next, under ultrasound guidance, the 17 mm mass in the medial left breast was     targeted.  2%       lidocaine was " "used for cutaneous local anesthesia. Lidocaine mixed with     epinephrine was used for       deep soft tissue local anesthesia and to assist with hemostasis. The 12 gauge     [employer] automated core       biopsy needle was advanced under ultrasound guidance to the lesion. A total of 3    core biopsy       specimens were obtained and placed in formalin, labeled \"Site B\" to be sent to     pathology for       further analysis. A wing shaped clip was then placed at the of biopsy under     ultrasound guidance.       Digital pressure was then held at the site of biopsy to assist with hemostasis.             Postprocedure mammograms demonstrate both biopsy clips to be in good position.      No immediate       complications occurred.             CONCLUSION:   Successful ultrasound guided core biopsy of 2 separate lesions in     the left breast. The       patient tolerated the procedure well without immediate complication. Specimens     have been sent to       pathology for further analysis.  Addendum will be dictated upon receiving final     pathology for       concordance and recommendations.              ROSSY DAVILA DO             Electronically Signed and Approved By: ROSSY DAVILA DO on 2020 at 10:38                   ADDENDUM:              Pathology results are available.             A:  03:00:  Invasive ductal carcinoma.             B:  09:00:  Invasive ductal carcinoma, low grade DCIS.      Patient: ZO CARLSON   Acct: #[id number]   Report: #[id number]            UNIT #: [id number]    DOS: 20 0743      INSURANCE:MEDICARE PART A   LOCATION:MRI     : 1934            PROVIDERS      ADMITTING:     ATTENDING: MAYNOR WHITLOCK MD      FAMILY:  SANTANAADRIEN   ORDERING:  MAYNOR WHITLOCK MD         OTHER:    DICTATING:  Yobany Campos MD            REQ #:[id number]   EXAM:BRSTBILWOW - MRI BREAST BILAT w wo CONTRAST      REASON FOR EXAM:  BREAST CA      REASON FOR VISIT:  BREAST CA          "   *******Signed with Addenda******                  ADDENDUM            This report includes an Addendum and supersedes previous reports for this exam.             PROCEDURE:   BREAST BILATERAL W/WO CONTRAST             COMPARISON:   [facility], US, ULTRASOUND BREAST BX 1ST LESION,     7/23/2020, 8:48.        [facility], US, US BREAST BILATERAL LIMITED, 7/14/2020,     14:48.  [facility], MG, DIG DIAG BILAT TORRES W 3D CAMRYN, 7/14/2020, 13:30.  [facility],       MG, DIG DX UNI POST BX NO CAD LT, 7/23/2020, 10:06.             INDICATIONS:   HIGH-RISK SCREENING. FAMILY H/O BREAST CA IN DAUGHTER.             CONTRAST:   10ML  Gadavist I.V.             TECHNIQUE:   Breast MRI was performed using dynamic intravenous gadolinium     infusion, thin sections,       and a dedicated breast coil.  Contrast enhancement analysis was assisted by     computer-aided       detection.               AMOUNT OF FIBROGLANDULAR TISSUE:   Heterogeneous fibroglandular tissue             BACKGROUND PARENCHYMAL ENHANCEMENT:   Moderate symmetric             FINDINGS:      The cardiac bolus is adequate.      Left breast:      Signal void/biopsy clip central to a 1.7 x 1.6 cm irregular enhancing mass the     central slightly       upper left breast from biopsy-proven invasive ductal carcinoma.  Surrounding     linear areas of       enhancement and a 0.7 cm focus of rapid uptake and washout kinetics 2 cm     anterior and inferior to       index tumor either from satellite malignancy or michael involvement.             Signal void/clip central to a 0.7 cm irregular enhancing lesion outer slightly     lower left breast       from biopsy-proven invasive ductal carcinoma.  Associated surrounding linear     enhancement in total       index tumor and surrounding enhancement approximately 2 cm.             0.6 cm mixed enhancing lesion lower outer left (116/143) breast anterior  depth     and a 0.6 cm       macrolobular enhancing lesion (76/143) superior central left breast with slow     uptake and persistent       plateau kinetics.  These may represent additional sites of multicentric disease.             Multiple left axillary lymph nodes with preserved central fatty prisca.             Right breast:      2.3 cm linear enhancement portion of which has mixed enhancement kinetics     (106/108/143) localized       in the anterior superior right breast is suspicious.  Surrounding enhancement     favored to represent       areas of fat necrosis given increased T1 signal and note of a stable biopsy clip    from remote benign       biopsy  A simple cyst was identified on diagnostic ultrasound for palpable     complaint (7/14/2020).             A 1.7 cm area of mixed enhancement kinetics (76/143) with predominantly rapid u    ptake and washout       posterior lateral to the right pectoralis musculature demonstrates fat     attenuation, however there       is anatomic variation suggesting motion correction artifact.  This is most     likely a lymph node.        The             IMPRESSION:      Right breast:  Biopsy-proven invasive ductal carcinoma at approximately the 0300    hours and 0900       hours positions consistent with multicentric disease.  There are additional     sites of abnormal       enhancement possibly representing additional sites of multicentric disease as     detailed above.        These areas could be further evaluated with second-look ultrasound or MR     directed biopsy if it       would affect clinical management.             Left breast:  2.3 cm linear enhancement anterior superior right breast.      Recommend MR directed       biopsy.             Left breast:  A 1.7 cm area of rapid uptake and washout kinetics posterior     lateral to the right       pectoralis musculature possibly artifact given anatomic variation .  Recommend     second-look       ultrasound to confirm  lymph node.             BIRADS:      DIAGNOSTIC CATEGORY 4--SUSPICIOUS        RECOMMENDATION(S):      ULTRASOUND: RIGHT BREAST  --NEED ADDITIONAL IMAGING EVALUATION.               MRI-GUIDED BREAST BIOPSY: RIGHT BREAST                        PLEASE NOTE:  A NORMAL MRI DOES NOT EXCLUDE THE POSSIBILITY OF BREAST CANCER.  A    CLINICALLY       SUSPICIOUS PALPABLE LUMP SHOULD BE BIOPSIED.                Yobany Campos M.D.             Electronically Signed and Approved By: Yobany Campos M.D. on 7/27/2020 at     14:07                ADDENDUM:                       Correction of impression secondary to discrepancy between right and left.  The     following impression       represents the correct breast designations .             Impression:             Left breast:  Biopsy-proven invasive ductal carcinoma at approximately the 0300     hours and 0900       hours positions consistent with multicentric disease.  There are additional     sites of abnormal       enhancement possibly representing additional sites of multicentric disease as     detailed above.        These areas could be further evaluated with second-look ultrasound or MR     directed biopsy if it       would affect clinical management.             Right breast:  2.3 cm linear enhancement anterior superior right breast.      Recommend MR directed       biopsy.             Right breast:  A 1.7 cm area of rapid uptake and washout kinetics posterior     lateral to the right       pectoralis musculature probably lymph node but further confirmation as there     appears to be a motion       correction artifact between the post gadolinium series and the conventional T1.              Yobany Campos M.D.             Electronically Signed and Approved By: Yobany Campos M.D. on 7/29/2020 at     12:34                                     07/29/20 1237            Yobany Campos MD            [facility]:      D:07/29/20 1234      T:              PROCEDURE:   BREAST  BILATERAL W/WO CONTRAST             COMPARISON:   [facility], US, ULTRASOUND BREAST BX 1ST LESION,     7/23/2020, 8:48.        [facility], US, US BREAST BILATERAL LIMITED, 7/14/2020,     14:48.  [facility], MG, DIG DIAG BILAT TORRES W 3D CAMRYN, 7/14/2020, 13:30.  [facility],       MG, DIG DX UNI POST BX NO CAD LT, 7/23/2020, 10:06.             INDICATIONS:   HIGH-RISK SCREENING. FAMILY H/O BREAST CA IN DAUGHTER.             CONTRAST:   10ML  Gadavist I.V.             TECHNIQUE:   Breast MRI was performed using dynamic intravenous gadolinium     infusion, thin sections,       and a dedicated breast coil.  Contrast enhancement analysis was assisted by     computer-aided       detection.               AMOUNT OF FIBROGLANDULAR TISSUE:   Heterogeneous fibroglandular tissue             BACKGROUND PARENCHYMAL ENHANCEMENT:   Moderate symmetric             FINDINGS:      The cardiac bolus is adequate.      Left breast:      Signal void/biopsy clip central to a 1.7 x 1.6 cm irregular enhancing mass the     central slightly       upper left breast from biopsy-proven invasive ductal carcinoma.  Surrounding     linear areas of       enhancement and a 0.7 cm focus of rapid uptake and washout kinetics 2 cm     anterior and inferior to       index tumor either from satellite malignancy or michael involvement.             Signal void/clip central to a 0.7 cm irregular enhancing lesion outer slightly     lower left breast       from biopsy-proven invasive ductal carcinoma.  Associated surrounding linear     enhancement in total       index tumor and surrounding enhancement approximately 2 cm.             0.6 cm mixed enhancing lesion lower outer left (116/143) breast anterior depth     and a 0.6 cm       macrolobular enhancing lesion (76/143) superior central left breast with slow     uptake and persistent       plateau kinetics.  These may represent  additional sites of multicentric disease.             Multiple left axillary lymph nodes with preserved central fatty prisca.             Right breast:      2.3 cm linear enhancement portion of which has mixed enhancement kinetics     (106/108/143) localized       in the anterior superior right breast is suspicious.  Surrounding enhancement     favored to represent       areas of fat necrosis given increased T1 signal and note of a stable biopsy clip    from remote benign       biopsy  A simple cyst was identified on diagnostic ultrasound for palpable     complaint (7/14/2020).             A 1.7 cm area of mixed enhancement kinetics (76/143) with predominantly rapid     uptake and washout       posterior lateral to the right pectoralis musculature demonstrates fat     attenuation, however there       is anatomic variation suggesting motion correction artifact.  This is most     likely a lymph node.        The             IMPRESSION:      Right breast:  Biopsy-proven invasive ductal carcinoma at approximately the 0300    hours and 0900       hours positions consistent with multicentric disease.  There are additional     sites of abnormal       enhancement possibly representing additional sites of multicentric disease as     detailed above.        These areas could be further evaluated with second-look ultrasound or MR     directed biopsy if it       would affect clinical management.             Left breast:  2.3 cm linear enhancement anterior superior right breast.      Recommend MR directed       biopsy.             Left breast:  A 1.7 cm area of rapid uptake and washout kinetics posterior     lateral to the right       pectoralis musculature possibly artifact given anatomic variation .  Recommend     second-look       ultrasound to confirm lymph node.             BIRADS:      DIAGNOSTIC CATEGORY 4--SUSPICIOUS        RECOMMENDATION(S):      ULTRASOUND: RIGHT BREAST  --NEED ADDITIONAL IMAGING EVALUATION.                MRI-GUIDED BREAST BIOPSY: RIGHT BREAST                        PLEASE NOTE:  A NORMAL MRI DOES NOT EXCLUDE THE POSSIBILITY OF BREAST CANCER.  A    CLINICALLY       SUSPICIOUS PALPABLE LUMP SHOULD BE BIOPSIED.                Yobany Campos M.D.             Electronically Signed and Approved By: Yobany Campos M.D. on 2020 at 1    4:07                               Until signed, this is an unconfirmed preliminary report that may contain      errors and is subject to change.                                              RAMSR:      D:20 1252            PAST, FAMILY   Past Medical History      Past Medical History:  Arthritis, Hypertension, Low WBC Count, Thyroid Disease      Hematology/Oncology (F):  Breast Cancer            Past Surgical History      Biopsy ([facility]), Cataract Surgery, Joint Replacement            Family History      Family History:  Breast Cancer (mat aunts), Leukemia (brother 3), Liver Cancer     (brother 1), Lung Cancer (brother 2)            daughter  with breast cancer      2 neices  from breast cancer      1 niece alive with breast cancer      Mother  due to surgical complications      Father  from heart issues            Social History      Marital Status:        Lives independently:  Yes      Number of Children:  2      Occupation:  retired            Tobacco Use      Tobacco status:  Former smoker      Currently Vaping:  No            Alcohol Use      Alcohol intake:  None            Substance Use      Substance use:  Denies use            REVIEW OF SYSTEMS      General:  Denies: Appetite Change, Fever, Night Sweats, Weight Loss      Eye:  Admits Corrective Lenses; Denies Vision Changes      ENT:  Denies Headache, Denies Hoarseness      Cardiovascular:  Denies Chest Pain, Denies Palpitations      Respiratory:  Denies: Coughing Blood, Productive Cough, Shortness of Air      Gastrointestinal:  Denies Constipation, Denies Diarrhea, Denies Nausea/Vomiting       Genitourinary:  Denies Blood in Urine, Denies Incontinence      Musculoskeletal:  Denies Back Pain; Admits Muscle Pain, Admits Painful Joints      Neurologic:  Denies Dizziness, Denies Numbness\Tingling      Psychiatric:  Denies Anxiety, Denies Depression      Hematologic/Lymphatic:  Denies Bruising, Denies Bleeding            VITAL SIGNS AND SCORES      Vitals      Height 5 ft 3.00 in / 160.02 cm      Weight 177 lbs  / 80.723125 kg      BSA 1.84 m2      BMI 31.4 kg/m2      Temperature 98 F / 36.67 C - Temporal      Pulse 70      Respirations 18      Blood Pressure 153/77 Sitting, Left Arm      Pulse Oximetry 99%, rm air            Pain Score      Pain Scale Used:  Numerical      Pain Intensity:  0            Fatigue Score      Fatigue (0-10 scale):  0 (none)            Rehab to be Ordered      Type of Referral to be Ordered:  No needs identified            EXAM      General Appearance:  Positive for: Alert, Cooperative;          Negative for: Acute Distress      Eye:  Positive for: Anicteric Sclerae, Moist Conjunctiva      Neck:  Positive for: Supple;          Negative for: JVD, Masses      Respiratory:  Positive for: CTAB, Normal Respiratory Effort      Breast - Left:  Positive for: Appearance (Bruising from recent biopsy at the 3     and 9:00 positions of the breast.  No obvious palpable abnormality although     examination is limited somewhat by tenderness from the bruising);          Negative for: Adenopathy, Discharge, Mass      Breast - Right:  Positive for: Appearance (Bruising on the breast from recent     biopsy.  No discrete masses, skin changes, nipple retraction, discharge);          Negative for: Adenopathy, Discharge, Mass      Abdomen/Gastro:  Positive for: Normal Active Bowel Sounds, Soft;          Negative for: Distention, Hepatosplenomegaly, Tenderness      Cardiovascular:  Positive for: RRR;          Negative for: Gallop, Murmur, Peripheral Edema, Rub      Psychiatric:  Positive for:  "Appropriate Affect, Intact Judgement      Lymphatic:  Negative for: Axillary, Cervical, Infraclavicular, Supraclavicular            PREVENTION      Hx Influenza Vaccination:  Yes      Date Influenza Vaccine Given:  Oct 1, 2019      2 or More Falls in Past Year?:  No      Fall Past Year with Injury?:  No      Hx Pneumococcal Vaccination:  Yes      Encouraged to follow-up with:  PCP regarding preventative exams.      Chart initiated by      RALPH SNOWDEN MA            ALLERGY/MEDS      Allergies      Coded Allergies:             MEPERIDINE (Verified  Allergy, Severe, DROWSY, \"KNOCKS ME OUT\", 8/6/20)           PENICILLINS (Verified  Allergy, Severe, SWELLING, DIARRHEA, RASH, 8/6/20)                  PT REPORTED SHE HAS NEVER HAD KEFLEX IN PAST           TERBINAFINE (Verified  Allergy, Intermediate, RASH, 8/6/20)           TETRACYCLINE (Verified  Allergy, Intermediate, RASH, DIARRHEA, 8/6/20)            Medications      Last Reconciled on 8/6/20 16:06 by EZIO POP      Acetaminophen (PAIN RELIEF (APAP)) 325 Mg Tab      650 MG PO BID PRN for PAIN OR FEVER, #100 TAB         Reported         7/24/20       Xu-Fluticasone (Fluticasone 50 mcg) 16 Gm Spray.susp      1 PUFFS NARE EACH QDAY, #1 BOTTLE 0 Refills         Reported         7/24/20       Vitamin B Complex (Vitamin B Complex) 1 Each Tablet      1 TAB PO QDAY, TAB         Reported         3/6/20       Losartan Potassium (Losartan*) 25 Mg Tablet      25 MG PO QDAY, #30 TAB 0 Refills         Reported         3/6/20       Metoprolol Succinate (Metoprolol Succinate) 25 Mg Tab.er.24h      12.5 MG PO QDAY, #15 TAB 0 Refills         Reported         3/6/20       Multivitamins (Multi-Vitamin) 1 Each Tablet      1 TAB PO QDAY, #30 TAB 1 Refill         Prov: AI HERNANDEZ         1/3/19       Ferrous Sulfate (Ferrous Sulfate*) 325 Mg Tablet      325 MG PO BID for 28 Days, #56 TAB 1 Refill         Prov: AI HERNANDEZ         1/3/19       Omeprazole (Omeprazole*) 20 Mg " Tablet.      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  I discussed case with Dr. Martinez, surgery who     feels that mastectomy would be required to render her disease-free.  Patient is     agreeable to the surgery.  Assuming no additional areas of concern were found,     she would not require adjuvant radiation after mastectomy.  Given her advanced     age I would not recommend adjuvant chemotherapy.  She is hormone receptor     positive and would benefit from adjuvant aromatase inhibitor therapy which is     taken by mouth for a minimum 5 years.  She is not sure that she is interested in    a pill but teaching information provided on letrozole for her to consider     between now and her next visit.  I will go ahead and order a DEXA scan in case     she opts to take adjuvant hormone therapy.  I will see her back in the     postoperative setting to review her pathology and finalize treatment planning.            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 Please see [employer] for the authenticated document.